# Patient Record
Sex: MALE | Race: WHITE | NOT HISPANIC OR LATINO | Employment: STUDENT | URBAN - METROPOLITAN AREA
[De-identification: names, ages, dates, MRNs, and addresses within clinical notes are randomized per-mention and may not be internally consistent; named-entity substitution may affect disease eponyms.]

---
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
## 2024-02-23 ENCOUNTER — OFFICE VISIT (OUTPATIENT)
Dept: PSYCHIATRY | Facility: CLINIC | Age: 19
End: 2024-02-23
Payer: COMMERCIAL

## 2024-02-23 VITALS
WEIGHT: 141.75 LBS | HEART RATE: 81 BPM | BODY MASS INDEX: 20.34 KG/M2 | SYSTOLIC BLOOD PRESSURE: 101 MMHG | DIASTOLIC BLOOD PRESSURE: 56 MMHG

## 2024-02-23 DIAGNOSIS — F41.1 GAD (GENERALIZED ANXIETY DISORDER): ICD-10-CM

## 2024-02-23 PROCEDURE — 99999 PR PBB SHADOW E&M-EST. PATIENT-LVL II: CPT | Mod: PBBFAC,,, | Performed by: STUDENT IN AN ORGANIZED HEALTH CARE EDUCATION/TRAINING PROGRAM

## 2024-02-23 PROCEDURE — 1160F RVW MEDS BY RX/DR IN RCRD: CPT | Mod: CPTII,S$GLB,, | Performed by: STUDENT IN AN ORGANIZED HEALTH CARE EDUCATION/TRAINING PROGRAM

## 2024-02-23 PROCEDURE — 99213 OFFICE O/P EST LOW 20 MIN: CPT | Mod: S$GLB,,, | Performed by: STUDENT IN AN ORGANIZED HEALTH CARE EDUCATION/TRAINING PROGRAM

## 2024-02-23 PROCEDURE — 1159F MED LIST DOCD IN RCRD: CPT | Mod: CPTII,S$GLB,, | Performed by: STUDENT IN AN ORGANIZED HEALTH CARE EDUCATION/TRAINING PROGRAM

## 2024-02-23 PROCEDURE — 3008F BODY MASS INDEX DOCD: CPT | Mod: CPTII,S$GLB,, | Performed by: STUDENT IN AN ORGANIZED HEALTH CARE EDUCATION/TRAINING PROGRAM

## 2024-02-23 PROCEDURE — 3078F DIAST BP <80 MM HG: CPT | Mod: CPTII,S$GLB,, | Performed by: STUDENT IN AN ORGANIZED HEALTH CARE EDUCATION/TRAINING PROGRAM

## 2024-02-23 PROCEDURE — 3074F SYST BP LT 130 MM HG: CPT | Mod: CPTII,S$GLB,, | Performed by: STUDENT IN AN ORGANIZED HEALTH CARE EDUCATION/TRAINING PROGRAM

## 2024-02-23 RX ORDER — BUPROPION HYDROCHLORIDE 150 MG/1
150 TABLET ORAL DAILY
Qty: 30 TABLET | Refills: 3 | Status: SHIPPED | OUTPATIENT
Start: 2024-02-23 | End: 2024-05-24 | Stop reason: SDUPTHER

## 2024-02-23 RX ORDER — ESCITALOPRAM OXALATE 20 MG/1
20 TABLET ORAL DAILY
Qty: 30 TABLET | Refills: 3 | Status: SHIPPED | OUTPATIENT
Start: 2024-02-23 | End: 2024-05-24 | Stop reason: SDUPTHER

## 2024-02-23 NOTE — PROGRESS NOTES
"OUTPATIENT PSYCHIATRY FOLLOW UP VISIT    ENCOUNTER DATE:  2/23/2024  SITE:  [facility], [facility]  LENGTH OF SESSION:  30 minutes      CHIEF COMPLAINT:  Follow up for anxiety      HISTORY OF PRESENTING ILLNESS:  Rigoberto Gomez is a 18 y.o. male with history of AVI who presents for follow up appointment.      Current medication regimen: Lexapro 20mg daily, Wellbutrin 150mg XL daily      Interval history as told by Patient - & or family/friend/spouse/caregiver with pts permission    At last appointment we continued Lexapro 20mg daily for anxiety and added Wellbutrin 150mg XL daily due to sexual side effects. Pt endorses daily compliance with medications.     Reports sexual side effects have been somewhat improved with addition of Wellbutrin.   Reports mood has been "on the up." Sleeping "alright" - from 12/1AM to 7:30/8AM. Anxiety levels have been manageable. Still says his anxiety is mostly over-thinking scenarios and ruminative thoughts of self-doubt. Denies any recent panic attacks. Denies worthlessness, excessive guilt, hopelessness, SI, HI, AVH.     Relationship has been stable - now dating for over 3 months.   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     Cutting back on THC use due to financial constraints (1g per week).   Using Zyn pouches (3-4 per day). Vaping minimally.   Denies alcohol use.     PSYCHIATRIC REVIEW OF SYSTEMS:(none/ yes- better/worse/stable/& what symptoms)    Symptoms of Depression: denies    Symptoms of Anxiety/ panic attacks: minimal anxiety level, able to control, no recent panic attacks    Symptoms of Katie/Hypomania: denies    Symptoms of psychosis: denies    Sleep: adequate (7-8 hours)    Appetite: good     Alcohol: denies     Illicit Drugs: THC    Psychosocial stressors: in a new relationship, school    Risk Parameters:  Patient reports no suicidal ideation  Patient reports no homicidal " "ideation  Patient reports no self-injurious behavior  Patient reports no violent behavior    PSYCHIATRIC MED REVIEW    Current psych meds: Lexapro 20mg daily, Wellbutrin 150mg XL daily  Medication side effects:  Lexapro - sexual side effects (improved with Wellbutrin)   Medication compliance:  yes    Previous psych meds trials  denies    PAST PSYCHIATRIC, MEDICAL, AND SOCIAL HISTORY REVIEWED  The patient's past medical, family and social history have been reviewed and updated as appropriate within the electronic medical record - see encounter notes.    MEDICAL REVIEW OF SYSTEMS:  Complete review of systems performed covering Constitutional, Musculoskeletal, Neurologic.  All systems negative    ALL MEDICATIONS:    Current Outpatient Medications:     buPROPion (WELLBUTRIN XL) 150 MG TB24 tablet, Take 1 tablet (150 mg total) by mouth once daily., Disp: 30 tablet, Rfl: 3    EScitalopram oxalate (LEXAPRO) 20 MG tablet, Take 1 tablet (20 mg total) by mouth once daily., Disp: 30 tablet, Rfl: 3    ALLERGIES:  Review of patient's allergies indicates:  No Known Allergies    RELEVANT LABS/STUDIES:    Lab Results   Component Value Date    WBC 4.57 09/14/2020    HGB 15.6 09/14/2020    HCT 47.0 09/14/2020    MCV 80 09/14/2020     09/14/2020     BMP  Lab Results   Component Value Date     09/14/2020    K 4.5 09/14/2020     09/14/2020    CO2 28 09/14/2020    BUN 10 09/14/2020    CREATININE 0.8 09/14/2020    CALCIUM 9.8 09/14/2020    ANIONGAP 9 09/14/2020    ESTGFRAFRICA SEE COMMENT 09/14/2020    EGFRNONAA SEE COMMENT 09/14/2020     Lab Results   Component Value Date    ALT 9 (L) 09/14/2020    AST 17 09/14/2020    ALKPHOS 108 09/14/2020    BILITOT 0.8 09/14/2020     Lab Results   Component Value Date    TSH 2.100 09/14/2020     No results found for: "LABA1C", "HGBA1C"    VITALS  Vitals:    02/23/24 1527   BP: (!) 101/56   Pulse: 81       PHYSICAL EXAM  General: well developed, well nourished, appears stated age, " "no distress  Neurologic:   Gait: Normal   Psychomotor signs:  No involuntary movements or tremor  AIMS: none    PSYCHIATRIC EXAM:     Mental Status Exam:  Appearance: unremarkable, age appropriate, casually dressed  Behavior/Cooperation: normal, friendly and cooperative, eye contact normal  Speech: normal tone, normal rate, normal pitch, normal volume  Language: uses words appropriately; NO aphasia or dysarthria  Mood: "alright"  Affect: reactive, appropriate  Thought Process: normal and logical  Thought Content: normal, no suicidality, no homicidality, delusions, or paranoia  Level of Consciousness: Alert and Oriented x3  Memory:  Intact   Recent:  Intact; able to report recent events   Remote: Intact to conversation  Attention/concentration: appropriate for age/education.   Fund of Knowledge: appears adequate  Insight:  Intact - has awareness of illness  Judgment: Intact - behavior is appropriate to circumstances      IMPRESSION:    Rigoberto Gomez is a 18 y.o. male with history of generalized anxiety disorder who presents for follow up appointment. Currently taking Lexapro 20mg daily. He reports medication has been helpful for his anxiety and OCD symptoms likely driven by his anxiety. Denies excessive anxiety or depression at present and desires to continue Lexapro at current dosage. We started Wellbutrin to assist with sexual side effects that Lexapro was likely causing. Potential medication side effects were reviewed. He was counseled on substance cessation - still smoking THC often. Will plan to follow up in 3 months. All questions and concerns were answered.     Status/Progress:  Based on the examination today, the patient's problem(s) is/are improved.  New problems have not been presented today.     DIAGNOSES:  Generalized anxiety disorder  Hx Unspecified depression  R/O Social anxiety disorder  R/O Cannabis use disorder    PLAN:  Psych Med:  Continue Lexapro 20mg daily for anxiety  Continue Wellbutrin 150mg " XL daily to assist with sexual side effects from Lexapro  I've explained to him that drugs of the SSRI class can have side effects such as weight gain, sexual dysfunction, insomnia, headache, nausea. These medications are generally effective at alleviating symptoms of anxiety and/or depression. Let me know if significant side effects do occur.  Discussed diagnosis, risks and benefits of proposed treatment vs alternative treatments vs no treatment, inherent unpredictability of medications and the potential side effects of these treatments specifically for: Wellbutrin including but not limited to seizures, increased blood pressure, increased suicidal thoughts upon initiation and serotonin syndrome which can lead to rhabdomyolysis, coma and death which was described as tremor, agitation, exaggerated reflexes, rhythmic muscle spasms (clonus) and a high fever which often occurs within hours/one day of starting or increasing the dose of the medication.  Discussed diagnosis, risks and benefits of proposed treatment vs alternative treatments vs no treatment, inherent unpredictability of medications and the potential side effects of these treatments specifically for: Wellbutrin including but not limited to seizures, increased blood pressure, increased suicidal thoughts upon initiation and serotonin syndrome which can lead to rhabdomyolysis, coma and death which was described as tremor, agitation, exaggerated reflexes, rhythmic muscle spasms (clonus) and a high fever which often occurs within hours/one day of starting or increasing the dose of the medication.  Discussed with patient informed consent, risks vs. benefits, alternative treatments, side effect profile and the inherent unpredictability of individual responses to these treatments. Answered any questions patient may have had. The patient expresses understanding of the above and displays the capacity to agree with this current plan       RETURN TO CLINIC:  3 months,  or sooner if needed      Usama Thompson MD  [facility] PGY-3  02/23/2024

## 2024-05-24 ENCOUNTER — OFFICE VISIT (OUTPATIENT)
Dept: PSYCHIATRY | Facility: CLINIC | Age: 19
End: 2024-05-24
Payer: COMMERCIAL

## 2024-05-24 VITALS
DIASTOLIC BLOOD PRESSURE: 58 MMHG | WEIGHT: 143.31 LBS | SYSTOLIC BLOOD PRESSURE: 103 MMHG | HEART RATE: 90 BPM | BODY MASS INDEX: 20.56 KG/M2

## 2024-05-24 DIAGNOSIS — F41.1 GAD (GENERALIZED ANXIETY DISORDER): ICD-10-CM

## 2024-05-24 PROCEDURE — 1160F RVW MEDS BY RX/DR IN RCRD: CPT | Mod: CPTII,S$GLB,, | Performed by: STUDENT IN AN ORGANIZED HEALTH CARE EDUCATION/TRAINING PROGRAM

## 2024-05-24 PROCEDURE — 3074F SYST BP LT 130 MM HG: CPT | Mod: CPTII,S$GLB,, | Performed by: STUDENT IN AN ORGANIZED HEALTH CARE EDUCATION/TRAINING PROGRAM

## 2024-05-24 PROCEDURE — 1159F MED LIST DOCD IN RCRD: CPT | Mod: CPTII,S$GLB,, | Performed by: STUDENT IN AN ORGANIZED HEALTH CARE EDUCATION/TRAINING PROGRAM

## 2024-05-24 PROCEDURE — 3008F BODY MASS INDEX DOCD: CPT | Mod: CPTII,S$GLB,, | Performed by: STUDENT IN AN ORGANIZED HEALTH CARE EDUCATION/TRAINING PROGRAM

## 2024-05-24 PROCEDURE — 3078F DIAST BP <80 MM HG: CPT | Mod: CPTII,S$GLB,, | Performed by: STUDENT IN AN ORGANIZED HEALTH CARE EDUCATION/TRAINING PROGRAM

## 2024-05-24 PROCEDURE — 99213 OFFICE O/P EST LOW 20 MIN: CPT | Mod: S$GLB,,, | Performed by: STUDENT IN AN ORGANIZED HEALTH CARE EDUCATION/TRAINING PROGRAM

## 2024-05-24 PROCEDURE — 99999 PR PBB SHADOW E&M-EST. PATIENT-LVL II: CPT | Mod: PBBFAC,,, | Performed by: STUDENT IN AN ORGANIZED HEALTH CARE EDUCATION/TRAINING PROGRAM

## 2024-05-24 RX ORDER — ESCITALOPRAM OXALATE 20 MG/1
20 TABLET ORAL DAILY
Qty: 30 TABLET | Refills: 3 | Status: SHIPPED | OUTPATIENT
Start: 2024-05-24

## 2024-05-24 RX ORDER — BUPROPION HYDROCHLORIDE 150 MG/1
150 TABLET ORAL DAILY
Qty: 30 TABLET | Refills: 3 | Status: SHIPPED | OUTPATIENT
Start: 2024-05-24

## 2024-05-24 NOTE — PROGRESS NOTES
OUTPATIENT PSYCHIATRY FOLLOW UP VISIT    ENCOUNTER DATE:  5/24/2024  SITE:  [facility], [facility]  LENGTH OF SESSION:  30 minutes    CHIEF COMPLAINT:  Follow up for anxiety      HISTORY OF PRESENTING ILLNESS:  Rigboerto Gomez is a 18 y.o. male with history of AVI who presents for follow up appointment. Last seen by this provider ~ 3 months ago.     Current medication regimen: Lexapro 20mg daily, Wellbutrin 150mg XL daily      Interval history as told by Patient - & or family/friend/spouse/caregiver with pts permission    At last appointment we continued Lexapro 20mg daily and Wellbutrin 150mg XL daily. Pt endorses daily compliance with medications. Feels like they are working well and denies any major side effects. Reports sexual side effects have been somewhat improved with addition of Wellbutrin. Desires to continue medication regimen at current dosages. He does say that he desires a trial of stopping Wellbutrin in the future to see if mood and anxiety remain stable.     School ended 2 weeks ago. 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 Considering transferring to [employer] in future. Was living in dorms at [facility] but now back in [place] for the summer.     Denies depressed mood. Reports he has been staying up later (between midnight and 2AM) and sleeping in later (often past 10AM) due to being out of school. Staying up playing video games. Reports lack of motivation at times and difficulty concentration. Still in a relationship. Denies worthlessness, excessive guilt, hopelessness, SI, HI, AVH.    PHQ9 = 3 (mild depression)    Reports that he has been feeling nervous/anxious/on edge more than half the days over the past 2 weeks. Says procrastination makes anxiety worse. Trying to figure out what he will be doing with school. Says he has trouble making big decisions. Major is currently undecided and he is unsure what he wants to pursue in school. Some trouble  relaxing, restlessness, irritability. Denies any recent panic attacks.   GAD7 = 6 (mild anxiety)    Cutting back on THC use due to financial constraints.   Denies nicotine or tobacco use in the interval.   Denies alcohol use.    PSYCHIATRIC REVIEW OF SYSTEMS:(none/ yes- better/worse/stable/& what symptoms)    Symptoms of Depression: amotivation, difficulty concentrating    Symptoms of Anxiety/ panic attacks: minimal anxiety level, able to control, no recent panic attacks    Symptoms of Katie/Hypomania: denies    Symptoms of psychosis: denies    Sleep: adequate (7-8 hours)    Appetite: good     Alcohol: denies     Illicit Drugs: THC    Psychosocial stressors: in a relationship, school, deciding major     Risk Parameters:  Patient reports no suicidal ideation  Patient reports no homicidal ideation  Patient reports no self-injurious behavior  Patient reports no violent behavior    PSYCHIATRIC MED REVIEW    Current psych meds: Lexapro 20mg daily, Wellbutrin 150mg XL daily  Medication side effects:  Lexapro - sexual side effects (improved with Wellbutrin)   Medication compliance:  yes    Previous psych meds trials  denies    PAST PSYCHIATRIC, MEDICAL, AND SOCIAL HISTORY REVIEWED  The patient's past medical, family and social history have been reviewed and updated as appropriate within the electronic medical record - see encounter notes.    MEDICAL REVIEW OF SYSTEMS:  Complete review of systems performed covering Constitutional, Musculoskeletal, Neurologic.  All systems negative    ALL MEDICATIONS:    Current Outpatient Medications:     buPROPion (WELLBUTRIN XL) 150 MG TB24 tablet, Take 1 tablet (150 mg total) by mouth once daily., Disp: 30 tablet, Rfl: 3    EScitalopram oxalate (LEXAPRO) 20 MG tablet, Take 1 tablet (20 mg total) by mouth once daily., Disp: 30 tablet, Rfl: 3    ALLERGIES:  Review of patient's allergies indicates:  No Known Allergies    RELEVANT LABS/STUDIES:    Lab Results   Component Value Date    WBC  "4.57 09/14/2020    HGB 15.6 09/14/2020    HCT 47.0 09/14/2020    MCV 80 09/14/2020     09/14/2020     BMP  Lab Results   Component Value Date     09/14/2020    K 4.5 09/14/2020     09/14/2020    CO2 28 09/14/2020    BUN 10 09/14/2020    CREATININE 0.8 09/14/2020    CALCIUM 9.8 09/14/2020    ANIONGAP 9 09/14/2020    ESTGFRAFRICA SEE COMMENT 09/14/2020    EGFRNONAA SEE COMMENT 09/14/2020     Lab Results   Component Value Date    ALT 9 (L) 09/14/2020    AST 17 09/14/2020    ALKPHOS 108 09/14/2020    BILITOT 0.8 09/14/2020     Lab Results   Component Value Date    TSH 2.100 09/14/2020     No results found for: "LABA1C", "HGBA1C"    VITALS  Vitals:    05/24/24 1430   BP: (!) 103/58   Pulse: 90       PHYSICAL EXAM  General: well developed, well nourished, appears stated age, no distress  Neurologic:   Gait: Normal   Psychomotor signs:  No involuntary movements or tremor  AIMS: none    PSYCHIATRIC EXAM:     Mental Status Exam:  Appearance: unremarkable, age appropriate, casually dressed  Behavior/Cooperation: normal, friendly and cooperative, eye contact normal  Speech: normal tone, normal rate, normal pitch, normal volume  Language: uses words appropriately; NO aphasia or dysarthria  Mood: "alright"  Affect: reactive, appropriate  Thought Process: normal and logical  Thought Content: normal, no suicidality, no homicidality, delusions, or paranoia  Level of Consciousness: Alert and Oriented x3  Memory:  Intact   Recent:  Intact; able to report recent events   Remote: Intact to conversation  Attention/concentration: appropriate for age/education.   Fund of Knowledge: appears adequate  Insight:  Intact - has awareness of illness  Judgment: Intact - behavior is appropriate to circumstances      IMPRESSION:    Rigoberto Gomez is a 18 y.o. male with history of generalized anxiety disorder who presents for follow up appointment. Currently taking Lexapro 20mg daily and Wellbutrin 150mg XL daily. He reports " medication has been helpful for his anxiety and OCD symptoms likely driven by his anxiety. He would like to continue medications at current dosages, but desires to trial stopping Wellbutrin in the future. Potential medication side effects were reviewed. He was counseled on substance cessation - still smoking THC often. Will plan to follow up in 3 months. All questions and concerns were answered.     Status/Progress:  Based on the examination today, the patient's problem(s) is/are improved.  New problems have not been presented today.     DIAGNOSES:  Generalized anxiety disorder  Hx Unspecified depression  R/O Social anxiety disorder  R/O Cannabis use disorder    PLAN:  Psych Med:  Continue Lexapro 20mg daily for anxiety  Continue Wellbutrin 150mg XL daily to assist with sexual side effects from Lexapro  Discussed resident transition  I've explained to him that drugs of the SSRI class can have side effects such as weight gain, sexual dysfunction, insomnia, headache, nausea. These medications are generally effective at alleviating symptoms of anxiety and/or depression. Let me know if significant side effects do occur.  Discussed diagnosis, risks and benefits of proposed treatment vs alternative treatments vs no treatment, inherent unpredictability of medications and the potential side effects of these treatments specifically for: Wellbutrin including but not limited to seizures, increased blood pressure, increased suicidal thoughts upon initiation and serotonin syndrome which can lead to rhabdomyolysis, coma and death which was described as tremor, agitation, exaggerated reflexes, rhythmic muscle spasms (clonus) and a high fever which often occurs within hours/one day of starting or increasing the dose of the medication.  Discussed diagnosis, risks and benefits of proposed treatment vs alternative treatments vs no treatment, inherent unpredictability of medications and the potential side effects of these treatments  specifically for: Wellbutrin including but not limited to seizures, increased blood pressure, increased suicidal thoughts upon initiation and serotonin syndrome which can lead to rhabdomyolysis, coma and death which was described as tremor, agitation, exaggerated reflexes, rhythmic muscle spasms (clonus) and a high fever which often occurs within hours/one day of starting or increasing the dose of the medication.  Discussed with patient informed consent, risks vs. benefits, alternative treatments, side effect profile and the inherent unpredictability of individual responses to these treatments. Answered any questions patient may have had. The patient expresses understanding of the above and displays the capacity to agree with this current plan       RETURN TO CLINIC:  3 months, or sooner if needed      Usama Thompson MD  [facility] PGY-3  05/24/2024

## 2024-06-08 ENCOUNTER — PATIENT MESSAGE (OUTPATIENT)
Dept: PEDIATRICS | Facility: CLINIC | Age: 19
End: 2024-06-08
Payer: COMMERCIAL